# Patient Record
Sex: MALE | Race: WHITE | NOT HISPANIC OR LATINO | URBAN - METROPOLITAN AREA
[De-identification: names, ages, dates, MRNs, and addresses within clinical notes are randomized per-mention and may not be internally consistent; named-entity substitution may affect disease eponyms.]

---

## 2024-08-02 ENCOUNTER — OFFICE VISIT (OUTPATIENT)
Dept: PRIMARY CARE | Facility: CLINIC | Age: 34
End: 2024-08-02
Payer: COMMERCIAL

## 2024-08-02 VITALS
BODY MASS INDEX: 28.44 KG/M2 | HEIGHT: 72 IN | HEART RATE: 98 BPM | WEIGHT: 210 LBS | DIASTOLIC BLOOD PRESSURE: 100 MMHG | SYSTOLIC BLOOD PRESSURE: 136 MMHG | OXYGEN SATURATION: 98 %

## 2024-08-02 DIAGNOSIS — L40.9 PSORIASIS: Primary | ICD-10-CM

## 2024-08-02 PROBLEM — F98.8 ADD (ATTENTION DEFICIT DISORDER) WITHOUT HYPERACTIVITY: Status: ACTIVE | Noted: 2024-08-02

## 2024-08-02 PROCEDURE — 99213 OFFICE O/P EST LOW 20 MIN: CPT | Performed by: FAMILY MEDICINE

## 2024-08-02 PROCEDURE — 3008F BODY MASS INDEX DOCD: CPT | Performed by: FAMILY MEDICINE

## 2024-08-02 PROCEDURE — 1036F TOBACCO NON-USER: CPT | Performed by: FAMILY MEDICINE

## 2024-08-02 RX ORDER — CLOBETASOL PROPIONATE 0.05 G/100ML
1 SHAMPOO TOPICAL DAILY
Qty: 118 ML | Refills: 1 | Status: SHIPPED | OUTPATIENT
Start: 2024-08-02 | End: 2025-08-02

## 2024-08-02 RX ORDER — CLOBETASOL PROPIONATE 0.5 MG/G
CREAM TOPICAL 2 TIMES DAILY
Qty: 60 G | Refills: 3 | Status: SHIPPED | OUTPATIENT
Start: 2024-08-02 | End: 2025-03-30

## 2024-08-02 ASSESSMENT — ENCOUNTER SYMPTOMS
ARTHRALGIAS: 0
ACTIVITY CHANGE: 0
CHILLS: 0
FATIGUE: 0
SHORTNESS OF BREATH: 0
JOINT SWELLING: 0
FEVER: 0
APPETITE CHANGE: 0

## 2024-08-02 ASSESSMENT — PATIENT HEALTH QUESTIONNAIRE - PHQ9
2. FEELING DOWN, DEPRESSED OR HOPELESS: NOT AT ALL
1. LITTLE INTEREST OR PLEASURE IN DOING THINGS: NOT AT ALL
SUM OF ALL RESPONSES TO PHQ9 QUESTIONS 1 AND 2: 0

## 2024-08-02 ASSESSMENT — PAIN SCALES - GENERAL: PAINLEVEL: 0-NO PAIN

## 2024-08-02 NOTE — PROGRESS NOTES
[facility]: [facility]  E/M EVALUATION    Syed Kelly is a 34 y.o. male who presents for Rash and Medication Problem (Patient quit taking Vyvanse due to expense, would like to discuss/dd).    Subjective   Pt here for new concern    Rash started in several areas. Small lesions, getting more and more, now in scalp  and left pinna.  Not itchy.  No illnesses.  No systemic symptoms.      Rash  Pertinent negatives include no fatigue, fever or shortness of breath.     Review of Systems   Constitutional:  Negative for activity change, appetite change, chills, fatigue and fever.   Respiratory:  Negative for shortness of breath.    Musculoskeletal:  Negative for arthralgias and joint swelling.   Skin:  Positive for rash.       Objective   Vitals:    08/02/24 1518   BP: (!) 136/100   Pulse: 98   SpO2: 98%     Physical Exam  Skin:     Comments: Numerous ovoid/oval lesions larges 3cm on right back.  Smallest 1 cm, each red with scale.  Scalp lesions present.            Assessment/Plan      There is no problem list on file for this patient.      Diagnoses and all orders for this visit:  Psoriasis  -     clobetasol (Temovate) 0.05 % cream; Apply topically 2 times a day.  -     clobetasoL 0.05 % shampoo; Apply 1 Dose topically once daily.      The patient was encouraged to ensure that any/all documentation is accurate and up to date, and that our office be provided a copy in the event that anything changes.         Harsh Moore MD

## 2024-08-09 ENCOUNTER — APPOINTMENT (OUTPATIENT)
Dept: PRIMARY CARE | Facility: CLINIC | Age: 34
End: 2024-08-09